# Patient Record
Sex: MALE | Race: WHITE | NOT HISPANIC OR LATINO | URBAN - METROPOLITAN AREA
[De-identification: names, ages, dates, MRNs, and addresses within clinical notes are randomized per-mention and may not be internally consistent; named-entity substitution may affect disease eponyms.]

---

## 2017-01-24 ENCOUNTER — OFFICE VISIT - RIVER FALLS (OUTPATIENT)
Dept: FAMILY MEDICINE | Facility: CLINIC | Age: 22
End: 2017-01-24

## 2017-01-24 ASSESSMENT — MIFFLIN-ST. JEOR: SCORE: 2409.19

## 2018-02-26 ENCOUNTER — OFFICE VISIT - RIVER FALLS (OUTPATIENT)
Dept: FAMILY MEDICINE | Facility: CLINIC | Age: 23
End: 2018-02-26

## 2018-02-26 ASSESSMENT — MIFFLIN-ST. JEOR: SCORE: 2324.82

## 2018-10-05 ENCOUNTER — OFFICE VISIT - RIVER FALLS (OUTPATIENT)
Dept: FAMILY MEDICINE | Facility: CLINIC | Age: 23
End: 2018-10-05

## 2022-02-11 VITALS
SYSTOLIC BLOOD PRESSURE: 122 MMHG | TEMPERATURE: 97.6 F | WEIGHT: 298.6 LBS | HEIGHT: 74 IN | BODY MASS INDEX: 38.32 KG/M2 | HEART RATE: 96 BPM | DIASTOLIC BLOOD PRESSURE: 74 MMHG | OXYGEN SATURATION: 95 %

## 2022-02-11 VITALS
WEIGHT: 280 LBS | BODY MASS INDEX: 35.94 KG/M2 | HEIGHT: 74 IN | HEART RATE: 74 BPM | DIASTOLIC BLOOD PRESSURE: 70 MMHG | SYSTOLIC BLOOD PRESSURE: 122 MMHG

## 2022-02-11 VITALS
DIASTOLIC BLOOD PRESSURE: 80 MMHG | BODY MASS INDEX: 38.34 KG/M2 | TEMPERATURE: 96 F | RESPIRATION RATE: 16 BRPM | SYSTOLIC BLOOD PRESSURE: 118 MMHG | HEART RATE: 64 BPM | HEIGHT: 74 IN

## 2022-02-15 NOTE — PROGRESS NOTES
Patient:   APTSY CANNON            MRN: [id number]            FIN: [id number]               Age:   22 years     Sex:  Male     :  1995   Associated Diagnoses:   Encounter for examination required by Department of Transportation (DOT)   Author:   Patrick Bravo PA-C      Chief Complaint   10/5/2018 1:15 PM CDT    Pt here for a DOT Px.        Subjective   Chief complaint 10/5/2018 1:15 PM CDT    Pt here for a DOT Px.   DOT exam.     Here for DOT exam  he works for [employer], drives the company van  no concerns today      Health Status   Allergies:    Allergic Reactions (Selected)  No known allergies   Medications:  (Selected)   Prescriptions  Prescribed  Wellbutrin  mg/12 hours oral tablet, extended release: 1 tab(s) ( 150 mg ), PO, BID, # 180 tab(s), 3 Refill(s), Type: Maintenance, Pharmacy: [id number], 1 tab(s) po bid,x90 day(s)  citalopram 40 mg oral tablet: 1 tab(s) ( 40 mg ), po, daily, # 90 tab(s), 3 Refill(s), Type: Maintenance, Pharmacy: [id number], 1 tab(s) po daily  fluticasone 50 mcg/inh nasal spray: See Instructions, Instructions: USE ONE SPRAY IN EACH NOSTRIL TWICE DAILY, # 16 gm, 1 Refill(s), Pharmacy: [id number]  Documented Medications  Documented  albuterol: 0 Refill(s), Type: Maintenance, not on any regular medications   Problem list:    All Problems  Obesity / ICD-9-.00 / Probable  Depression / SNOMED CT 14686990 / Confirmed  Patellofemoral Instability / ICD-9-.36 / Confirmed      Objective         Vital Signs   10/5/2018 1:15 PM CDT Temperature Tympanic 96 DegF  LOW    Peripheral Pulse Rate 64 bpm    Pulse Site Radial artery    Respiratory Rate 16 br/min    Systolic Blood Pressure 118 mmHg    Diastolic Blood Pressure 80 mmHg    Mean Arterial Pressure 93 mmHg    BP Site Right arm      Measurements from flowsheet : Measurements   10/5/2018 1:15 PM CDT    Height Measured - Standard                74 in     General:  No acute distress.     Eye:  Pupils are equal, round and reactive to light, Extraocular movements are intact, visual acuity is normal, horizontal field of vision is 90 degrees bilaterally.    HENT:  Tympanic membranes are clear, Normal hearing, No pharyngeal erythema, No sinus tenderness.    Neck:  Supple, Non-tender, No lymphadenopathy.    Respiratory:  Lungs are clear to auscultation.    Cardiovascular:  Normal rate, Regular rhythm, No murmur.    Gastrointestinal:  Soft, Non-tender, Non-distended, Normal bowel sounds, No organomegaly.    Genitourinary:  testicles smooth symmetrical, without nodules, no rashes or lesions, no hernia present.    Musculoskeletal:  Normal range of motion.    Neurologic:  Cranial Nerves II-XII are grossly intact, Normal deep tendon reflexes, Romberg is negative.    Psychiatric:  Appropriate mood & affect.       Results Review   Results review   Urinalysis is normal      Impression and Plan   Assessment and Plan:          Diagnosis: Encounter for examination required by Department of Transportation (DOT) (ESW33-GY Z02.89).    Orders     Orders   Charges:  9939D DOT Exam (Charge) (Order): Quantity: 1, Encounter for examination required by Department of Transportation (DOT).     Normal DOT exam; Approved for 2 years without restrictions.     .

## 2022-02-15 NOTE — PROGRESS NOTES
Patient:   PATSY CANNON            MRN: [id number]            FIN: [id number]               Age:   21 years     Sex:  Male     :  1995   Associated Diagnoses:   Depression   Author:   Don Lutz MD      Subjective   Chief complaint 2017 4:25 PM CST    pt here for fu with depression and axiety, states the med works well for his anxiety but he would like to try something else for his depression, he does still have a med refill of his med but would like more refills if possible.     This 21 year old is following up for depression and anxiety.  He is not in school anymore and says that actually has helped because he has formulated a plan of trying to become an .  He is applying for some apprenticeships.  He feels really good about this.  He says he is no longer very anxious.  He is comfortable.  He says he can sleep but he knows he is still depressed.  He says there are days he just doesn t want to get out of bed and do things.  He has not had any manic episodes or at least he denies any period of prolonged high energy, sleepiness, or upbeat mood but he definitely goes through periods where he has two or three days of quite down time.  He says the citalopram didn t really seem to help that as much.  He is trying to get up and be active.  He has some friends that support him.  He feels safe and secure.  He has not had suicidal thoughts.         Health Status   Allergies:    Allergic Reactions (Selected)  No known allergies   Medications:  (Selected)   Prescriptions  Prescribed  Wellbutrin  mg/12 hours oral tablet, extended release: 1 tab(s) ( 150 mg ), PO, BID, # 60 tab(s), 3 Refill(s), Type: Maintenance, Pharmacy: [id number], 1 tab(s) po bid  citalopram 40 mg oral tablet: 1 tab(s) ( 40 mg ), po, daily, # 90 tab(s), 1 Refill(s), Type: Maintenance, Pharmacy: [id number], 1 tab(s) po daily  fluticasone 50 mcg/inh nasal spray: See Instructions, Instructions: USE ONE  SPRAY IN EACH NOSTRIL TWICE DAILY, # 16 gm, 1 Refill(s), Pharmacy: [id number]  Documented Medications  Documented  albuterol: 0 Refill(s), Type: Maintenance   Problem list:    All Problems  Obesity / 278.00 / Probable  Patellofemoral Instability / 718.36 / Confirmed  Depression / 39226326 / Confirmed      Objective   Vital Signs   1/24/2017 4:25 PM CST Temperature Tympanic 97.6 DegF  LOW    Peripheral Pulse Rate 96 bpm    Pulse Site Radial artery    Systolic Blood Pressure 122 mmHg    Diastolic Blood Pressure 74 mmHg    Mean Arterial Pressure 90 mmHg    BP Site Right arm    Oxygen Saturation 95 %      Measurements from flowsheet : Measurements   1/24/2017 4:25 PM CST Height Measured - Standard 74 in    Weight Measured - Standard 298.6 lb    BSA 2.66 m2    Body Mass Index 38.33 kg/m2      General:  Alert and oriented, No acute distress.    Neck:  Non-tender.    Respiratory:  Lungs are clear to auscultation.    Integumentary:  Warm.    Psychiatric:  Cooperative, Appropriate mood & affect.       Impression and Plan   Assessment and Plan:          Diagnosis: Depression (VZL76-VW F32.9).         Course: Patient who, on citalopram, has had some good response but incomplete; especially of his more profound depression.  I have recommended he add Wellbutrin SR twice a day and see him back in a month.  He has not had obvious manic episodes but there is at least some hint in his history of some down cycles and I think he needs to be closely watched for bipolar symptoms..    Orders      (Selected)   Prescriptions  Prescribed  Wellbutrin  mg/12 hours oral tablet, extended release: 1 tab(s) ( 150 mg ), PO, BID, # 60 tab(s), 3 Refill(s), Type: Maintenance, Pharmacy: [id number], 1 tab(s) po bid  citalopram 40 mg oral tablet: 1 tab(s) ( 40 mg ), po, daily, # 90 tab(s), 1 Refill(s), Type: Maintenance, Pharmacy: [id number], 1 tab(s) po daily.     .

## 2022-02-15 NOTE — PROGRESS NOTES
Patient:   PATSY CANNON            MRN: [id number]            FIN: [id number]               Age:   22 years     Sex:  Male     :  1995   Associated Diagnoses:   Depression; Tinea cruris   Author:   Don Lutz MD      Visit Information      Date of Service: 2018 06:32 pm  Performing Location: [facility]  Encounter#: [id number]      Primary Care Provider (PCP):  [id number] -UNKNOWN,      Referring Provider:  Don Lutz MD    NPI# [id number]      Chief Complaint   2018 6:37 PM CST    refill medication. c/o itching in groin and lost alot of weight in the last year      Subjective   Chief complaint 2018 6:37 PM CST    refill medication. c/o itching in groin and lost alot of weight in the last year.     [id number] 21 year old is following up for depression and anxiety.  He is not in school anymore and says that actually has helped because he has formulated a plan of trying to become an .  He is applying for some apprenticeships.  He feels really good about this.  He says he is no longer very anxious.  He is comfortable.  He says he can sleep but he knows he is still depressed.  He says there are days he just doesn t want to get out of bed and do things.  He has not had any manic episodes or at least he denies any period of prolonged high energy, sleepiness, or upbeat mood but he definitely goes through periods where he has two or three days of quite down time.  He says the citalopram didn t really seem to help that as much.  He is trying to get up and be active.  He has some friends that support him.  He feels safe and secure.  He has not had suicidal thoughts.    2018 This 22-year-old now is very happy with the past year.  He is doing a job as an  and likes it.  He finds every day fulfilling.   He really hasn t had issues.  He thinks he is taking better care of himself.   He has been active and eating much healthier resulting in some weight loss  that he has found desirable.  Overall, he just wants things to be maintained the same.  He would like to stay on his present medications because he believes they have helped him achieve this.          Health Status   Allergies:    Allergic Reactions (Selected)  No known allergies   Medications:  (Selected)   Prescriptions  Prescribed  Diflucan 150 mg oral tablet: 1 tab(s) ( 150 mg ), PO, Once, # 1 tab(s), 0 Refill(s), Type: Soft Stop, Pharmacy: [id number], 1 tab(s) po once  Wellbutrin  mg/12 hours oral tablet, extended release: 1 tab(s) ( 150 mg ), PO, BID, # 180 tab(s), 3 Refill(s), Type: Maintenance, Pharmacy: [id number], 1 tab(s) po bid,x90 day(s)  citalopram 40 mg oral tablet: 1 tab(s) ( 40 mg ), po, daily, # 90 tab(s), 3 Refill(s), Type: Maintenance, Pharmacy: [id number], 1 tab(s) po daily  fluticasone 50 mcg/inh nasal spray: See Instructions, Instructions: USE ONE SPRAY IN EACH NOSTRIL TWICE DAILY, # 16 gm, 1 Refill(s), Pharmacy: [facility]  nystatin 100,000 units/g topical ointment: 1 mono, TOP, bid, PRN: for rash, # 30 gm, 1 Refill(s), Type: Maintenance, Pharmacy: [id number], 1 mono top bid,PRN:for rash  Documented Medications  Documented  albuterol: 0 Refill(s), Type: Maintenance   Problem list:    All Problems  Obesity / 278.00 / Probable  Patellofemoral Instability / 718.36 / Confirmed  Depression / 35292316 / Confirmed      Objective   Vital Signs   2/26/2018 6:37 PM CST Peripheral Pulse Rate 74 bpm    Systolic Blood Pressure 122 mmHg    Diastolic Blood Pressure 70 mmHg    Mean Arterial Pressure 87 mmHg      Measurements from flowsheet : Measurements   2/26/2018 6:37 PM CST Height Measured - Standard 74 in    Weight Measured - Standard 280 lb    BSA 2.57 m2    Body Mass Index 35.95 kg/m2  HI      General:  Alert and oriented, No acute distress.    Neck:  Non-tender.    Respiratory:  Lungs are clear to auscultation.    Integumentary:  Warm.     GENITOURINARY:  Some red skin in the bilateral groins.   Psychiatric:  Cooperative, Appropriate mood & affect.       Impression and Plan   Assessment and Plan:          Diagnosis: Depression (LWR58-EK F32.9), Tinea cruris (YYH88-CD B35.6).         Course: Depression is very well controlled.  He will continue on his present medication.  I reminded him of the parts besides medication that are important in controlling it - activity, good sleep, and social support.    Orders      (Selected)   Prescriptions  Prescribed  Diflucan 150 mg oral tablet: 1 tab(s) ( 150 mg ), PO, Once, # 1 tab(s), 0 Refill(s), Type: Soft Stop, Pharmacy: [id number], 1 tab(s) po once  Wellbutrin  mg/12 hours oral tablet, extended release: 1 tab(s) ( 150 mg ), PO, BID, # 180 tab(s), 3 Refill(s), Type: Maintenance, Pharmacy: [id number], 1 tab(s) po bid,x90 day(s)  citalopram 40 mg oral tablet: 1 tab(s) ( 40 mg ), po, daily, # 90 tab(s), 3 Refill(s), Type: Maintenance, Pharmacy: [id number], 1 tab(s) po daily  nystatin 100,000 units/g topical ointment: 1 mono, TOP, bid, PRN: for rash, # 30 gm, 1 Refill(s), Type: Maintenance, Pharmacy: [id number], 1 mono top bid,PRN:for rash.     .